# Patient Record
Sex: FEMALE | Race: ASIAN | NOT HISPANIC OR LATINO | URBAN - METROPOLITAN AREA
[De-identification: names, ages, dates, MRNs, and addresses within clinical notes are randomized per-mention and may not be internally consistent; named-entity substitution may affect disease eponyms.]

---

## 2017-04-28 PROBLEM — 429006005 FAMILY HISTORY OF MALIGNANT NEOPLASM OF GASTROINTESTINAL TRACT: Status: ACTIVE | Noted: 2017-04-28

## 2017-04-28 PROBLEM — 275978004 SCREENING FOR MALIGNANT NEOPLASM OF COLON: Status: ACTIVE | Noted: 2017-04-28

## 2017-04-28 PROBLEM — 74474003 GASTROINTESTINAL HEMORRHAGE: Status: ACTIVE | Noted: 2017-04-28

## 2017-04-28 PROBLEM — 14760008 CONSTIPATION: Status: ACTIVE | Noted: 2017-04-28

## 2017-04-28 PROBLEM — 426867001 ANORECTAL DISORDER: Status: ACTIVE | Noted: 2017-04-28

## 2017-06-12 PROBLEM — 92040001 BENIGN NEOPLASM OF CECUM: Status: ACTIVE | Noted: 2017-06-12

## 2020-12-16 ENCOUNTER — OFFICE VISIT (OUTPATIENT)
Dept: URBAN - METROPOLITAN AREA CLINIC 37 | Facility: CLINIC | Age: 45
End: 2020-12-16

## 2020-12-16 VITALS — BODY MASS INDEX: 26.43 KG/M2 | HEIGHT: 61 IN | WEIGHT: 140 LBS

## 2020-12-16 PROBLEM — 12063002: Status: ACTIVE | Noted: 2020-12-16

## 2020-12-16 PROBLEM — 428283002: Status: ACTIVE | Noted: 2020-12-16

## 2020-12-16 PROBLEM — 721704005 SECOND DEGREE HEMORRHOIDS: Status: ACTIVE | Noted: 2017-04-28

## 2020-12-16 RX ORDER — AMOXICILLIN 500 MG
AS DIRECTED CAPSULE ORAL
Status: ACTIVE | COMMUNITY

## 2020-12-16 RX ORDER — HYDROCORTISONE ACETATE 25 MG/1
1 SUPPOSITORY SUPPOSITORY RECTAL QHS
Qty: 14 | Refills: 2 | OUTPATIENT
Start: 2020-12-16

## 2020-12-16 RX ORDER — DOCUSATE SODIUM 100 MG/1
2 CAPSULES CAPSULE ORAL ONCE A DAY
Qty: 60 | Refills: 5 | Status: ON HOLD | COMMUNITY

## 2020-12-16 RX ORDER — HYDROCORTISONE ACETATE 25 MG/1
1 SUPPOSITORY SUPPOSITORY RECTAL QHS
Qty: 12 | Refills: 2 | Status: ON HOLD | COMMUNITY
Start: 2017-04-28

## 2020-12-16 RX ORDER — DOCUSATE SODIUM 100 MG/1
2 CAPSULES CAPSULE ORAL ONCE A DAY
Qty: 60 CAPSULE | Refills: 2 | OUTPATIENT
Start: 2020-12-16

## 2021-03-16 ENCOUNTER — DASHBOARD ENCOUNTERS (OUTPATIENT)
Age: 46
End: 2021-03-16

## 2021-03-18 ENCOUNTER — OFFICE VISIT (OUTPATIENT)
Dept: URBAN - METROPOLITAN AREA CLINIC 37 | Facility: CLINIC | Age: 46
End: 2021-03-18

## 2021-03-18 VITALS — WEIGHT: 135 LBS | HEIGHT: 61 IN | BODY MASS INDEX: 25.49 KG/M2

## 2021-03-18 RX ORDER — DOCUSATE SODIUM 100 MG/1
2 CAPSULES CAPSULE ORAL ONCE A DAY
Qty: 60 CAPSULE | Refills: 5

## 2021-03-18 RX ORDER — HYDROCORTISONE ACETATE 25 MG/1
1 SUPPOSITORY SUPPOSITORY RECTAL QHS
Qty: 12 | Refills: 2 | Status: ON HOLD | COMMUNITY
Start: 2017-04-28

## 2021-03-18 RX ORDER — DOCUSATE SODIUM 100 MG/1
2 CAPSULES CAPSULE ORAL ONCE A DAY
Qty: 60 CAPSULE | Refills: 2 | Status: ON HOLD | COMMUNITY
Start: 2020-12-16

## 2021-03-18 RX ORDER — HYDROCORTISONE ACETATE 25 MG/1
1 SUPPOSITORY SUPPOSITORY RECTAL QHS
Qty: 14 | Refills: 5

## 2021-03-18 RX ORDER — DOCUSATE SODIUM 100 MG/1
2 CAPSULES CAPSULE ORAL ONCE A DAY
Qty: 60 | Refills: 5 | Status: ON HOLD | COMMUNITY

## 2021-03-18 RX ORDER — HYDROCORTISONE ACETATE 25 MG/1
1 SUPPOSITORY SUPPOSITORY RECTAL QHS
Qty: 14 | Refills: 2 | Status: ON HOLD | COMMUNITY
Start: 2020-12-16

## 2021-03-18 RX ORDER — AMOXICILLIN 500 MG
AS DIRECTED CAPSULE ORAL
Status: ACTIVE | COMMUNITY

## 2021-03-18 NOTE — HPI-MIGRATED HPI
Follow up OV : Patient is here for a routine OV for -> Rectal bleeding/Hemorrhoids;   Follow up OV : Last OV was -> 2 months ago  Patient has a personal history of second degree internal hemorrhoids and has had intermittent rectal bleeding          On 04/2017, patient had a Anoscopy which showed grade II internal hemorrhoids in LL and RL positions, s/p banding ligations x 2;   Follow up OV : Patient is on -> Hydrocortisone 25 mg suppository QHS + Colace 200 mg daily;   Follow up OV : Current symptoms are -> ???;

## 2022-10-04 ENCOUNTER — OFFICE VISIT (OUTPATIENT)
Dept: URBAN - METROPOLITAN AREA CLINIC 82 | Facility: CLINIC | Age: 47
End: 2022-10-04

## 2025-03-31 ENCOUNTER — OFFICE VISIT (OUTPATIENT)
Dept: URBAN - METROPOLITAN AREA CLINIC 37 | Facility: CLINIC | Age: 50
End: 2025-03-31

## 2025-03-31 PROBLEM — 428283002: Status: ACTIVE | Noted: 2025-03-31

## 2025-03-31 RX ORDER — LOSARTAN POTASSIUM 25 MG/1
TABLET, FILM COATED ORAL
Qty: 30 TABLET | Status: ACTIVE | COMMUNITY

## 2025-03-31 RX ORDER — SOD SULF/POT CHLORIDE/MAG SULF 1.479 G
12 TABLETS THE FIRST DOSE THE EVENING BEFORE AND SECOND DOSE THE MORNING OF COLONOSCOPY TABLET ORAL TWICE A DAY
Qty: 24 TABLET | Refills: 0 | OUTPATIENT
Start: 2025-03-31 | End: 2025-04-01

## 2025-04-07 ENCOUNTER — LAB OUTSIDE AN ENCOUNTER (OUTPATIENT)
Dept: URBAN - METROPOLITAN AREA CLINIC 37 | Facility: CLINIC | Age: 50
End: 2025-04-07

## 2025-05-19 ENCOUNTER — OFFICE VISIT (OUTPATIENT)
Dept: URBAN - METROPOLITAN AREA MEDICAL CENTER 10 | Facility: MEDICAL CENTER | Age: 50
End: 2025-05-19
Payer: COMMERCIAL

## 2025-05-19 DIAGNOSIS — K62.1 ANAL AND RECTAL POLYP: ICD-10-CM

## 2025-05-19 DIAGNOSIS — Z86.0101 H/O ADENOMATOUS POLYP OF COLON: ICD-10-CM

## 2025-05-19 DIAGNOSIS — D12.0 ADENOMA OF CECUM: ICD-10-CM

## 2025-05-19 DIAGNOSIS — D12.4 ADENOMA OF DESCENDING COLON: ICD-10-CM

## 2025-05-19 PROCEDURE — 0529F INTRVL 3/>YR PTS CLNSCP DOCD: CPT | Performed by: INTERNAL MEDICINE

## 2025-05-19 PROCEDURE — 45380 COLONOSCOPY AND BIOPSY: CPT | Performed by: INTERNAL MEDICINE

## 2025-05-19 PROCEDURE — 45385 COLONOSCOPY W/LESION REMOVAL: CPT | Performed by: INTERNAL MEDICINE

## 2025-05-19 RX ORDER — LOSARTAN POTASSIUM 25 MG/1
TABLET, FILM COATED ORAL
Qty: 30 TABLET | Status: ACTIVE | COMMUNITY

## 2025-06-02 ENCOUNTER — OFFICE VISIT (OUTPATIENT)
Dept: URBAN - METROPOLITAN AREA MEDICAL CENTER 10 | Facility: MEDICAL CENTER | Age: 50
End: 2025-06-02

## 2025-06-16 ENCOUNTER — OFFICE VISIT (OUTPATIENT)
Dept: URBAN - METROPOLITAN AREA TELEHEALTH 2 | Facility: TELEHEALTH | Age: 50
End: 2025-06-16
Payer: COMMERCIAL

## 2025-06-16 DIAGNOSIS — K63.5 COLON POLYP, HYPERPLASTIC: ICD-10-CM

## 2025-06-16 DIAGNOSIS — D12.4 POLYP OF DESCENDING COLON: ICD-10-CM

## 2025-06-16 PROCEDURE — 99212 OFFICE O/P EST SF 10 MIN: CPT | Performed by: NURSE PRACTITIONER

## 2025-06-16 RX ORDER — LOSARTAN POTASSIUM 25 MG/1
TABLET, FILM COATED ORAL
Qty: 30 TABLET | Status: ACTIVE | COMMUNITY